# Patient Record
Sex: FEMALE | Race: ASIAN
[De-identification: names, ages, dates, MRNs, and addresses within clinical notes are randomized per-mention and may not be internally consistent; named-entity substitution may affect disease eponyms.]

---

## 2020-01-31 PROBLEM — Z00.00 ENCOUNTER FOR PREVENTIVE HEALTH EXAMINATION: Status: ACTIVE | Noted: 2020-01-31

## 2020-02-04 ENCOUNTER — APPOINTMENT (OUTPATIENT)
Dept: UROGYNECOLOGY | Facility: CLINIC | Age: 80
End: 2020-02-04
Payer: MEDICARE

## 2020-02-04 DIAGNOSIS — E07.9 DISORDER OF THYROID, UNSPECIFIED: ICD-10-CM

## 2020-02-04 DIAGNOSIS — I51.9 HEART DISEASE, UNSPECIFIED: ICD-10-CM

## 2020-02-04 DIAGNOSIS — Z83.3 FAMILY HISTORY OF DIABETES MELLITUS: ICD-10-CM

## 2020-02-04 DIAGNOSIS — M81.0 AGE-RELATED OSTEOPOROSIS W/OUT CURRENT PATHOLOGICAL FRACTURE: ICD-10-CM

## 2020-02-04 DIAGNOSIS — R31.9 HEMATURIA, UNSPECIFIED: ICD-10-CM

## 2020-02-04 DIAGNOSIS — I10 ESSENTIAL (PRIMARY) HYPERTENSION: ICD-10-CM

## 2020-02-04 DIAGNOSIS — Z63.4 DISAPPEARANCE AND DEATH OF FAMILY MEMBER: ICD-10-CM

## 2020-02-04 DIAGNOSIS — E78.00 PURE HYPERCHOLESTEROLEMIA, UNSPECIFIED: ICD-10-CM

## 2020-02-04 DIAGNOSIS — Z82.49 FAMILY HISTORY OF ISCHEMIC HEART DISEASE AND OTHER DISEASES OF THE CIRCULATORY SYSTEM: ICD-10-CM

## 2020-02-04 LAB
BILIRUB UR QL STRIP: NEGATIVE
GLUCOSE UR-MCNC: NEGATIVE
HCG UR QL: 0.2 EU/DL
HGB UR QL STRIP.AUTO: NORMAL
KETONES UR-MCNC: NEGATIVE
LEUKOCYTE ESTERASE UR QL STRIP: NEGATIVE
NITRITE UR QL STRIP: NEGATIVE
PH UR STRIP: 7
PROT UR STRIP-MCNC: NEGATIVE
SP GR UR STRIP: 1.01

## 2020-02-04 PROCEDURE — 81003 URINALYSIS AUTO W/O SCOPE: CPT | Mod: QW

## 2020-02-04 PROCEDURE — 51701 INSERT BLADDER CATHETER: CPT

## 2020-02-04 PROCEDURE — 99204 OFFICE O/P NEW MOD 45 MIN: CPT

## 2020-02-04 RX ORDER — AMLODIPINE BESYLATE 5 MG/1
TABLET ORAL
Refills: 0 | Status: ACTIVE | COMMUNITY

## 2020-02-04 RX ORDER — DENOSUMAB 60 MG/ML
INJECTION SUBCUTANEOUS
Refills: 0 | Status: ACTIVE | COMMUNITY

## 2020-02-04 RX ORDER — METOPROLOL SUCCINATE 50 MG/1
50 TABLET, EXTENDED RELEASE ORAL
Refills: 0 | Status: ACTIVE | COMMUNITY

## 2020-02-04 RX ORDER — LEVOTHYROXINE SODIUM 200 MCG
VIAL (EA) INTRAVENOUS
Refills: 0 | Status: ACTIVE | COMMUNITY

## 2020-02-04 RX ORDER — ATORVASTATIN CALCIUM 80 MG/1
TABLET, FILM COATED ORAL
Refills: 0 | Status: ACTIVE | COMMUNITY

## 2020-02-04 SDOH — SOCIAL STABILITY - SOCIAL INSECURITY: DISSAPEARANCE AND DEATH OF FAMILY MEMBER: Z63.4

## 2020-02-04 NOTE — PHYSICAL EXAM
[No Acute Distress] : in no acute distress [Oriented x3] : oriented to person, place, and time [No Edema] : ~T edema was not present [Symmetrical] : the neck was ~L symmetrical [Warm and Dry] : was warm and dry to touch [Exam Deferred] : was deferred [Normal Gait] : gait was normal [Soft, Nontender] : the abdomen was soft and nontender [None] : no CVA tenderness [Bartholin's Gland] : both Bartholin's glands were normal  [Labia Minora] : were normal [Labia Majora] : were normal [Normal Appearance] : general appearance was normal [No Bleeding] : there was no active vaginal bleeding [2] : 2 [Ba ____] : Ba [unfilled] [Aa ____] : Aa [unfilled] [PB ____] : PB [unfilled] [C ____] : C [unfilled] [GH ____] : GH [unfilled] [Ap ____] : Ap [unfilled] [TVL ____] : TVL  [unfilled] [D ____] : D [unfilled] [Bp ____] : Bp [unfilled] [] : 0 [Normal] : normal [Soft] :  the cervix was soft [Post Void Residual ____ml] : post void residual via catheterization was [unfilled] ml [Distended] : not distended [Tenderness] : ~T no ~M abdominal tenderness observed [Cough] : no cough [Inguinal LAD] : no adenopathy was noted in the inguinal lymph nodes [FreeTextEntry3] : no urethral hypermobility, CST neg [de-identified] : no appreciable masses [de-identified] : uroflow normal voiding pattern and max flow rate

## 2020-02-04 NOTE — HISTORY OF PRESENT ILLNESS
[FreeTextEntry1] : About 6 months ago, was told she has microhematuria. Never with gross hematuria. Only changes in health status/meds was Prolia injections, so questioned if it may be a side effect from Prolia. No urinary urgency, freq, leakage; no UTIs or dysuria, no flank pain. Not sexually active, , never had children. Normal bowel movements without straining or constipation. No bulge or pressure in vagina. No pain.\par \par +cardiac stents in situ\par never a smoker\par \par Recent records:\par 8/2019 hgba1c 5.7%, Bun/Cr 15/0.8, GFR 68\par 9/2019 Abd sono uterus 4 x 3 x 1 cm, EE 1 mm, ovs normal but limited eval, no adnexal masses, no FF, ?nabothian cyst

## 2020-02-04 NOTE — ASSESSMENT
[FreeTextEntry1] : Zoey is a pleasant 80 yo , , G0,with cardiac stents, referred for microscopic hematuria. On exam, her empty supine CST was neg, and she did not have positive urethral hypermobility. Her straight-cath PVR volume was normal and was sent for UA, UCx, and voided urine was sent for cytology based on hx. On pelvic exam, there were no appreciable masses or lesions. Pelvic floor muscle contraction strength was present but weak. POPQ exam did not demonstrate clinically significant pelvic organ prolapse. She had atrophic vaginal changes.\par \par We discussed the diagnosis of MH with RBCs on micro UA and concurrent UCx neg. We discussed cytol check as well for abnormal cells. We discussed if 3 or more rbcs, I recommend eval with CT urogram and cystourethroscopy in office. She understood. Path such as mass, cancer, lesion, polyp, stone, or more likely not to have any findings discussed. She understood. All ques answered.\par \par Plan:\par [] f/u cytology\par [] f/u UA UCx for MH - if positive --> CT urogram and office cystourethroscopy\par

## 2020-02-04 NOTE — OB HISTORY
[Total Preg ___] : : [unfilled] [Sexually Active] : is not sexually active [FreeTextEntry1] : reports hx normal pap smears

## 2020-02-04 NOTE — PROCEDURE
[Straight Catheterization] : insertion of a straight catheter [Patient] : the patient [Hematuria] : hematuria [___ Fr Straight Tip] : a [unfilled] in [place] straight tip catheter [None] : there were no complications with the catheter insertion [Culture] : culture [Cytology] : cytology [Clear] : clear [Tolerated Well] : the patient tolerated the procedure well [Urinalysis] : urinalysis [No Complications] : no complications [Post procedure instructions and information given] : Post procedure instructions and information were given and reviewed with patient. [2] : 2 [FreeTextEntry1] : cathed for uncontaminated specimen and PVR

## 2020-02-05 LAB
APPEARANCE: CLEAR
BACTERIA: NEGATIVE
BILIRUBIN URINE: NEGATIVE
BLOOD URINE: NEGATIVE
COLOR: NORMAL
GLUCOSE QUALITATIVE U: NEGATIVE
HYALINE CASTS: 0 /LPF
KETONES URINE: NEGATIVE
LEUKOCYTE ESTERASE URINE: NEGATIVE
MICROSCOPIC-UA: NORMAL
NITRITE URINE: NEGATIVE
PH URINE: 6.5
PROTEIN URINE: NEGATIVE
RED BLOOD CELLS URINE: 1 /HPF
SPECIFIC GRAVITY URINE: 1
SQUAMOUS EPITHELIAL CELLS: 0 /HPF
UROBILINOGEN URINE: NORMAL
WHITE BLOOD CELLS URINE: 0 /HPF

## 2020-02-06 LAB — BACTERIA UR CULT: NORMAL

## 2020-02-07 LAB — URINE CYTOLOGY: NORMAL

## 2022-08-05 ENCOUNTER — APPOINTMENT (OUTPATIENT)
Dept: ORTHOPEDIC SURGERY | Facility: CLINIC | Age: 82
End: 2022-08-05

## 2022-08-05 VITALS — WEIGHT: 120 LBS | HEIGHT: 61 IN | BODY MASS INDEX: 22.66 KG/M2

## 2022-08-05 DIAGNOSIS — M17.12 UNILATERAL PRIMARY OSTEOARTHRITIS, LEFT KNEE: ICD-10-CM

## 2022-08-05 DIAGNOSIS — M54.32 SCIATICA, LEFT SIDE: ICD-10-CM

## 2022-08-05 PROCEDURE — 99203 OFFICE O/P NEW LOW 30 MIN: CPT

## 2022-08-05 PROCEDURE — 73562 X-RAY EXAM OF KNEE 3: CPT | Mod: LT

## 2022-08-05 RX ORDER — RIVAROXABAN 20 MG/1
20 TABLET, FILM COATED ORAL
Qty: 90 | Refills: 0 | Status: ACTIVE | COMMUNITY
Start: 2022-02-08

## 2022-08-05 NOTE — HISTORY OF PRESENT ILLNESS
[Gradual] : gradual [7] : 7 [4] : 4 [Diffuse] : diffuse [Shooting] : shooting [Stabbing] : stabbing [Constant] : constant [Leisure] : leisure [Rest] : rest [Walking] : walking [Retired] : Work status: retired [de-identified] : Patient presents in office today with left leg pain ongoing for 1 month. NKI. Patient has been going to PT, reports no improvement. Patient reports that she has pain that radiates down her left left into her left foot. [] : Post Surgical Visit: no [FreeTextEntry1] : Lt knee [FreeTextEntry3] : 1 month [FreeTextEntry5] : NKI [FreeTextEntry7] : Up and down leg

## 2022-08-05 NOTE — PHYSICAL EXAM
[de-identified] : Examination of the left knee is as follows:\par INSPECTION: varus alignment, mild effusion, but no ecchymosis, no erythema, no atrophy, no deformities of quad tendon or of patellar tendon\par PALPATION: medial joint line tenderness, posterior tenderness over distal hamstrings, but no palpable mass, no obvious defects, no increased warmth, no calf tenderness\par ROM: flexion 125 degrees, but extension 0 degrees\par STRENGTH: quadriceps 5/5, hamstring 5/5\par NEURO: light touch is intact throughout\par GAIT: mildly antalgic, but patient ambulates without assistive device\par \par X-rays of the left knee is as follows: \par Knee 3 view in the anteroposterior, lateral, skyline views: severe tricompartmental OA with medial joint space narrowing and varus alignment

## 2022-08-05 NOTE — ASSESSMENT
[FreeTextEntry1] : 82 yo female presenting with severe left knee oa with medial joint space narrowing and varus alignment in addition to sciatica. Patient reports that she is on xarelto \par -Discussed with patient that she will be indicated for left TKA when pain becomes severe\par -Discussed risks, benefits, alternatives of left knee intraarticular CSI, patient does not want at this time\par -Patient scheduled to see Dr. Mckay\par -Activities as tolerated\par -Rest, ice, compression, elevation, Tylenol PRN for pain\par -All questions answered\par -F/u PRN\par \par \par

## 2022-08-12 ENCOUNTER — APPOINTMENT (OUTPATIENT)
Dept: ORTHOPEDIC SURGERY | Facility: CLINIC | Age: 82
End: 2022-08-12

## 2022-08-12 DIAGNOSIS — M41.25 OTHER IDIOPATHIC SCOLIOSIS, THORACOLUMBAR REGION: ICD-10-CM

## 2022-08-12 PROCEDURE — 72100 X-RAY EXAM L-S SPINE 2/3 VWS: CPT

## 2022-08-12 PROCEDURE — 99204 OFFICE O/P NEW MOD 45 MIN: CPT

## 2022-08-12 RX ORDER — METHYLPREDNISOLONE 4 MG/1
4 TABLET ORAL
Qty: 1 | Refills: 0 | Status: ACTIVE | COMMUNITY
Start: 2022-08-12 | End: 1900-01-01

## 2022-08-15 NOTE — ASSESSMENT
[FreeTextEntry1] : Radiculopathy and extremity weakness despite conservative care. Recommend MRI for further evaluation.\par \par Recommend: - NSAID - Heating pad - Muscle relaxer - Core strengthening exercise - Hamstring stretching exercise Patient is given back rehabilitation exercise book.\par

## 2022-08-15 NOTE — HISTORY OF PRESENT ILLNESS
[9] : 9 [5] : 5 [Walking] : walking [Retired] : Work status: retired [de-identified] : Patient reports pain, numbess and tingling radiating down left leg\par Denies back pain\par Reports seeing Dr Martinez for her knee\par Reports pain is worse when walking \par Reports taking Tylenol TID with no relief. \par \par Patient  underwent 6 to 7 weeks of PT with some improvement.  [] : no

## 2022-08-17 ENCOUNTER — FORM ENCOUNTER (OUTPATIENT)
Age: 82
End: 2022-08-17

## 2022-08-22 ENCOUNTER — RESULT REVIEW (OUTPATIENT)
Age: 82
End: 2022-08-22

## 2022-08-25 ENCOUNTER — APPOINTMENT (OUTPATIENT)
Dept: ORTHOPEDIC SURGERY | Facility: CLINIC | Age: 82
End: 2022-08-25

## 2022-08-25 VITALS — WEIGHT: 120 LBS | HEIGHT: 61 IN | BODY MASS INDEX: 22.66 KG/M2

## 2022-08-25 DIAGNOSIS — M54.16 RADICULOPATHY, LUMBAR REGION: ICD-10-CM

## 2022-08-25 DIAGNOSIS — M48.061 SPINAL STENOSIS, LUMBAR REGION WITHOUT NEUROGENIC CLAUDICATION: ICD-10-CM

## 2022-08-25 DIAGNOSIS — M51.36 OTHER INTERVERTEBRAL DISC DEGENERATION, LUMBAR REGION: ICD-10-CM

## 2022-08-25 PROCEDURE — 99214 OFFICE O/P EST MOD 30 MIN: CPT

## 2022-08-25 NOTE — DATA REVIEWED
[MRI] : MRI [Lumbar Spine] : lumbar spine [Report was reviewed and noted in the chart] : The report was reviewed and noted in the chart [I independently reviewed and interpreted images and report] : I independently reviewed and interpreted images and report [FreeTextEntry1] : Right facet arthropathy L5-S1\par Moderate stenosis L4-5

## 2022-08-25 NOTE — ASSESSMENT
[FreeTextEntry1] : Right facet arthropathy L5-S1\par Moderate stenosis L4-5\par \par Recommend: - NSAID - Heating pad - Muscle relaxer - Core strengthening exercise - Hamstring stretching exercise Patient is given back rehabilitation exercise book.\par \par Continue PT \par \par Follow up in 2 months

## 2022-08-25 NOTE — HISTORY OF PRESENT ILLNESS
[9] : 9 [5] : 5 [Walking] : walking [Retired] : Work status: retired [de-identified] : Follow up lumbar spine MRI Results at . Feeling an improvement since last visit. Going to PT 1x a week. Finished Medrol pack. Denies pain meds. [] : no

## 2023-03-09 ENCOUNTER — NON-APPOINTMENT (OUTPATIENT)
Age: 83
End: 2023-03-09

## 2023-03-09 ENCOUNTER — APPOINTMENT (OUTPATIENT)
Dept: OPHTHALMOLOGY | Facility: CLINIC | Age: 83
End: 2023-03-09
Payer: MEDICARE

## 2023-03-09 PROCEDURE — 92004 COMPRE OPH EXAM NEW PT 1/>: CPT
